# Patient Record
Sex: MALE | Race: WHITE | NOT HISPANIC OR LATINO | Employment: OTHER | URBAN - NONMETROPOLITAN AREA
[De-identification: names, ages, dates, MRNs, and addresses within clinical notes are randomized per-mention and may not be internally consistent; named-entity substitution may affect disease eponyms.]

---

## 2017-05-16 ENCOUNTER — OFFICE VISIT (OUTPATIENT)
Dept: CARDIOLOGY | Facility: CLINIC | Age: 62
End: 2017-05-16

## 2017-05-16 VITALS
DIASTOLIC BLOOD PRESSURE: 82 MMHG | HEART RATE: 62 BPM | HEIGHT: 72 IN | SYSTOLIC BLOOD PRESSURE: 120 MMHG | BODY MASS INDEX: 31.42 KG/M2 | WEIGHT: 232 LBS

## 2017-05-16 DIAGNOSIS — R94.31 ABNORMAL EKG: Primary | ICD-10-CM

## 2017-05-16 DIAGNOSIS — E78.5 HYPERLIPIDEMIA, UNSPECIFIED HYPERLIPIDEMIA TYPE: ICD-10-CM

## 2017-05-16 DIAGNOSIS — E66.01 MORBID OBESITY DUE TO EXCESS CALORIES (HCC): ICD-10-CM

## 2017-05-16 DIAGNOSIS — R53.82 CHRONIC FATIGUE: ICD-10-CM

## 2017-05-16 DIAGNOSIS — I10 ESSENTIAL HYPERTENSION: ICD-10-CM

## 2017-05-16 PROCEDURE — 93000 ELECTROCARDIOGRAM COMPLETE: CPT | Performed by: INTERNAL MEDICINE

## 2017-05-16 PROCEDURE — 99203 OFFICE O/P NEW LOW 30 MIN: CPT | Performed by: INTERNAL MEDICINE

## 2017-05-16 RX ORDER — RANITIDINE 150 MG/1
300 TABLET ORAL 2 TIMES DAILY
COMMUNITY
End: 2017-08-01

## 2017-05-16 RX ORDER — FEXOFENADINE HCL 180 MG/1
180 TABLET ORAL DAILY
COMMUNITY

## 2017-05-16 RX ORDER — LISINOPRIL 10 MG/1
10 TABLET ORAL DAILY
COMMUNITY

## 2017-05-16 RX ORDER — IBUPROFEN 400 MG/1
400 TABLET ORAL EVERY 6 HOURS PRN
COMMUNITY
End: 2017-08-01

## 2017-05-16 RX ORDER — LANOLIN ALCOHOL/MO/W.PET/CERES
50 CREAM (GRAM) TOPICAL DAILY
COMMUNITY

## 2017-05-16 RX ORDER — TOPIRAMATE 100 MG/1
100 TABLET, FILM COATED ORAL DAILY
COMMUNITY
End: 2019-02-21

## 2017-05-16 RX ORDER — HYDROCHLOROTHIAZIDE 25 MG/1
25 TABLET ORAL DAILY
COMMUNITY

## 2017-05-16 RX ORDER — SIMVASTATIN 20 MG
20 TABLET ORAL NIGHTLY
COMMUNITY

## 2017-06-06 ENCOUNTER — HOSPITAL ENCOUNTER (OUTPATIENT)
Dept: NUCLEAR MEDICINE | Facility: HOSPITAL | Age: 62
Discharge: HOME | End: 2017-06-06
Attending: INTERNAL MEDICINE

## 2017-06-06 ENCOUNTER — HOSPITAL ENCOUNTER (OUTPATIENT)
Dept: CARDIOLOGY | Facility: HOSPITAL | Age: 62
Discharge: HOME | End: 2017-06-06
Attending: INTERNAL MEDICINE

## 2017-06-06 VITALS
OXYGEN SATURATION: 100 % | HEART RATE: 51 BPM | RESPIRATION RATE: 18 BRPM | WEIGHT: 232 LBS | SYSTOLIC BLOOD PRESSURE: 111 MMHG | BODY MASS INDEX: 31.42 KG/M2 | DIASTOLIC BLOOD PRESSURE: 73 MMHG | HEIGHT: 72 IN

## 2017-06-06 VITALS
HEART RATE: 52 BPM | HEIGHT: 72 IN | WEIGHT: 232 LBS | BODY MASS INDEX: 31.42 KG/M2 | DIASTOLIC BLOOD PRESSURE: 69 MMHG | SYSTOLIC BLOOD PRESSURE: 119 MMHG

## 2017-06-06 DIAGNOSIS — R53.82 CHRONIC FATIGUE: ICD-10-CM

## 2017-06-06 DIAGNOSIS — E78.5 HYPERLIPIDEMIA, UNSPECIFIED HYPERLIPIDEMIA TYPE: ICD-10-CM

## 2017-06-06 DIAGNOSIS — R94.31 ABNORMAL EKG: ICD-10-CM

## 2017-06-06 DIAGNOSIS — I10 ESSENTIAL HYPERTENSION: ICD-10-CM

## 2017-06-06 DIAGNOSIS — E66.01 MORBID OBESITY DUE TO EXCESS CALORIES (HCC): ICD-10-CM

## 2017-06-06 LAB
AORTIC ARCH: 2.3 CM
ASCENDING AORTA: 3 CM
BH CV ECHO MEAS - ACS: 2.1 CM
BH CV ECHO MEAS - AO MAX PG (FULL): 3.1 MMHG
BH CV ECHO MEAS - AO MAX PG: 6.7 MMHG
BH CV ECHO MEAS - AO MEAN PG (FULL): 1 MMHG
BH CV ECHO MEAS - AO MEAN PG: 3 MMHG
BH CV ECHO MEAS - AO ROOT AREA (BSA CORRECTED): 1.5
BH CV ECHO MEAS - AO ROOT AREA: 8.6 CM^2
BH CV ECHO MEAS - AO ROOT DIAM: 3.3 CM
BH CV ECHO MEAS - AO V2 MAX: 129 CM/SEC
BH CV ECHO MEAS - AO V2 MEAN: 81.3 CM/SEC
BH CV ECHO MEAS - AO V2 VTI: 28.6 CM
BH CV ECHO MEAS - ASC AORTA: 3 CM
BH CV ECHO MEAS - AVA(I,A): 3.2 CM^2
BH CV ECHO MEAS - AVA(I,D): 3.2 CM^2
BH CV ECHO MEAS - AVA(V,A): 2.8 CM^2
BH CV ECHO MEAS - AVA(V,D): 2.8 CM^2
BH CV ECHO MEAS - BSA(HAYCOCK): 2.3 M^2
BH CV ECHO MEAS - BSA: 2.3 M^2
BH CV ECHO MEAS - BZI_BMI: 31.5 KILOGRAMS/M^2
BH CV ECHO MEAS - BZI_METRIC_HEIGHT: 182.9 CM
BH CV ECHO MEAS - BZI_METRIC_WEIGHT: 105.2 KG
BH CV ECHO MEAS - CONTRAST EF (2CH): 60.1 ML/M^2
BH CV ECHO MEAS - CONTRAST EF 4CH: 59 ML/M^2
BH CV ECHO MEAS - EDV(CUBED): 105.2 ML
BH CV ECHO MEAS - EDV(MOD-SP2): 80 ML
BH CV ECHO MEAS - EDV(MOD-SP4): 110 ML
BH CV ECHO MEAS - EDV(TEICH): 103.4 ML
BH CV ECHO MEAS - EF(CUBED): 69.4 %
BH CV ECHO MEAS - EF(MOD-SP2): 60.1 %
BH CV ECHO MEAS - EF(MOD-SP4): 59 %
BH CV ECHO MEAS - EF(TEICH): 61 %
BH CV ECHO MEAS - ESV(CUBED): 32.2 ML
BH CV ECHO MEAS - ESV(MOD-SP2): 31.9 ML
BH CV ECHO MEAS - ESV(MOD-SP4): 45.1 ML
BH CV ECHO MEAS - ESV(TEICH): 40.3 ML
BH CV ECHO MEAS - FS: 32.6 %
BH CV ECHO MEAS - IVS/LVPW: 1.1
BH CV ECHO MEAS - IVSD: 1.1 CM
BH CV ECHO MEAS - LAT PEAK E' VEL: 12 CM/SEC
BH CV ECHO MEAS - LV DIASTOLIC VOL/BSA (35-75): 48.5 ML/M^2
BH CV ECHO MEAS - LV MASS(C)D: 181.7 GRAMS
BH CV ECHO MEAS - LV MASS(C)DI: 80.1 GRAMS/M^2
BH CV ECHO MEAS - LV MAX PG: 3.5 MMHG
BH CV ECHO MEAS - LV MEAN PG: 2 MMHG
BH CV ECHO MEAS - LV SYSTOLIC VOL/BSA (12-30): 19.9 ML/M^2
BH CV ECHO MEAS - LV V1 MAX: 93.8 CM/SEC
BH CV ECHO MEAS - LV V1 MEAN: 63.3 CM/SEC
BH CV ECHO MEAS - LV V1 VTI: 24 CM
BH CV ECHO MEAS - LVIDD: 4.7 CM
BH CV ECHO MEAS - LVIDS: 3.2 CM
BH CV ECHO MEAS - LVLD AP2: 8.3 CM
BH CV ECHO MEAS - LVLD AP4: 8.5 CM
BH CV ECHO MEAS - LVLS AP2: 7.4 CM
BH CV ECHO MEAS - LVLS AP4: 7.9 CM
BH CV ECHO MEAS - LVOT AREA (M): 3.8 CM^2
BH CV ECHO MEAS - LVOT AREA: 3.8 CM^2
BH CV ECHO MEAS - LVOT DIAM: 2.2 CM
BH CV ECHO MEAS - LVPWD: 1 CM
BH CV ECHO MEAS - MED PEAK E' VEL: 9 CM/SEC
BH CV ECHO MEAS - MV A DUR: 0.1 SEC
BH CV ECHO MEAS - MV A MAX VEL: 64 CM/SEC
BH CV ECHO MEAS - MV DEC SLOPE: 541 CM/SEC^2
BH CV ECHO MEAS - MV DEC TIME: 0.17 SEC
BH CV ECHO MEAS - MV E MAX VEL: 90.2 CM/SEC
BH CV ECHO MEAS - MV E/A: 1.4
BH CV ECHO MEAS - MV MAX PG: 3.5 MMHG
BH CV ECHO MEAS - MV MEAN PG: 1 MMHG
BH CV ECHO MEAS - MV P1/2T MAX VEL: 99.3 CM/SEC
BH CV ECHO MEAS - MV P1/2T: 53.8 MSEC
BH CV ECHO MEAS - MV V2 MAX: 93.1 CM/SEC
BH CV ECHO MEAS - MV V2 MEAN: 40.9 CM/SEC
BH CV ECHO MEAS - MV V2 VTI: 31.2 CM
BH CV ECHO MEAS - MVA P1/2T LCG: 2.2 CM^2
BH CV ECHO MEAS - MVA(P1/2T): 4.1 CM^2
BH CV ECHO MEAS - MVA(VTI): 2.9 CM^2
BH CV ECHO MEAS - PA ACC TIME: 0.1 SEC
BH CV ECHO MEAS - PA MAX PG (FULL): 1.1 MMHG
BH CV ECHO MEAS - PA MAX PG: 3 MMHG
BH CV ECHO MEAS - PA PR(ACCEL): 34.5 MMHG
BH CV ECHO MEAS - PA V2 MAX: 86.9 CM/SEC
BH CV ECHO MEAS - PULM A REVS DUR: 0.11 SEC
BH CV ECHO MEAS - PULM A REVS VEL: 36 CM/SEC
BH CV ECHO MEAS - PULM DIAS VEL: 37.9 CM/SEC
BH CV ECHO MEAS - PULM S/D: 1.8
BH CV ECHO MEAS - PULM SYS VEL: 67.3 CM/SEC
BH CV ECHO MEAS - PVA(V,A): 2 CM^2
BH CV ECHO MEAS - PVA(V,D): 2 CM^2
BH CV ECHO MEAS - QP/QS: 0.46
BH CV ECHO MEAS - RV MAX PG: 1.9 MMHG
BH CV ECHO MEAS - RV MEAN PG: 1 MMHG
BH CV ECHO MEAS - RV V1 MAX: 69.5 CM/SEC
BH CV ECHO MEAS - RV V1 MEAN: 48.9 CM/SEC
BH CV ECHO MEAS - RV V1 VTI: 16.6 CM
BH CV ECHO MEAS - RVOT AREA: 2.5 CM^2
BH CV ECHO MEAS - RVOT DIAM: 1.8 CM
BH CV ECHO MEAS - SI(AO): 107.8 ML/M^2
BH CV ECHO MEAS - SI(CUBED): 32.2 ML/M^2
BH CV ECHO MEAS - SI(LVOT): 40.2 ML/M^2
BH CV ECHO MEAS - SI(MOD-SP2): 21.2 ML/M^2
BH CV ECHO MEAS - SI(MOD-SP4): 28.6 ML/M^2
BH CV ECHO MEAS - SI(TEICH): 27.8 ML/M^2
BH CV ECHO MEAS - SUP REN AO DIAM: 1.8 CM
BH CV ECHO MEAS - SV(AO): 244.6 ML
BH CV ECHO MEAS - SV(CUBED): 73 ML
BH CV ECHO MEAS - SV(LVOT): 91.2 ML
BH CV ECHO MEAS - SV(MOD-SP2): 48.1 ML
BH CV ECHO MEAS - SV(MOD-SP4): 64.9 ML
BH CV ECHO MEAS - SV(RVOT): 42.2 ML
BH CV ECHO MEAS - SV(TEICH): 63 ML
BH CV ECHO MEAS - TAPSE (>1.6): 2.4 CM2
BH CV NUCLEAR PRIOR STUDY: 3
BH CV STRESS BP STAGE 1: NORMAL
BH CV STRESS BP STAGE 2: NORMAL
BH CV STRESS BP STAGE 3: NORMAL
BH CV STRESS COMMENTS STAGE 1: NORMAL
BH CV STRESS COMMENTS STAGE 2: NORMAL
BH CV STRESS DOSE REGADENOSON STAGE 1: 0.4
BH CV STRESS DURATION MIN STAGE 1: 0
BH CV STRESS DURATION MIN STAGE 2: 4
BH CV STRESS DURATION SEC STAGE 1: 15
BH CV STRESS DURATION SEC STAGE 2: 0
BH CV STRESS HR STAGE 1: 56
BH CV STRESS HR STAGE 2: 76
BH CV STRESS HR STAGE 3: 65
BH CV STRESS O2 STAGE 1: 100
BH CV STRESS O2 STAGE 2: 100
BH CV STRESS PROTOCOL 1: NORMAL
BH CV STRESS RECOVERY BP: NORMAL MMHG
BH CV STRESS RECOVERY HR: 61 BPM
BH CV STRESS STAGE 1: 1
BH CV STRESS STAGE 2: 2
BH CV STRESS STAGE 3: 3
BH CV STRESS STAGE 4: 4
BH CV VAS BP RIGHT ARM: NORMAL MMHG
BH CV XLRA - RV BASE: 2.9 CM
BH CV XLRA - TDI S': 13 CM/SEC
E/E' RATIO: 9
LEFT ATRIUM VOLUME INDEX: 20 ML/M2
LV EF NUC BP: 55 %
MAXIMAL PREDICTED HEART RATE: 159 BPM
PERCENT MAX PREDICTED HR: 49.06 %
STRESS BASELINE BP: NORMAL MMHG
STRESS BASELINE HR: 51 BPM
STRESS O2 SAT REST: 100 %
STRESS PERCENT HR: 58 %
STRESS POST ESTIMATED WORKLOAD: 1 METS
STRESS POST EXERCISE DUR SEC: 30 SEC
STRESS POST PEAK BP: NORMAL MMHG
STRESS POST PEAK HR: 78 BPM
STRESS TARGET HR: 135 BPM

## 2017-06-06 PROCEDURE — 93017 CV STRESS TEST TRACING ONLY: CPT

## 2017-06-06 PROCEDURE — 0 TECHNETIUM SESTAMIBI: Performed by: INTERNAL MEDICINE

## 2017-06-06 PROCEDURE — 93306 TTE W/DOPPLER COMPLETE: CPT | Performed by: INTERNAL MEDICINE

## 2017-06-06 PROCEDURE — 25010000002 PERFLUTREN (DEFINITY) 8.476 MG IN SODIUM CHLORIDE 10 ML INJECTION: Performed by: INTERNAL MEDICINE

## 2017-06-06 PROCEDURE — A9500 TC99M SESTAMIBI: HCPCS | Performed by: INTERNAL MEDICINE

## 2017-06-06 PROCEDURE — C8929 TTE W OR WO FOL WCON,DOPPLER: HCPCS

## 2017-06-06 PROCEDURE — 25010000002 REGADENOSON 0.4 MG/5ML SOLUTION: Performed by: INTERNAL MEDICINE

## 2017-06-06 PROCEDURE — 93018 CV STRESS TEST I&R ONLY: CPT | Performed by: INTERNAL MEDICINE

## 2017-06-06 PROCEDURE — 0399T HC MYOCARDL STRAIN IMAG QUAN ASSMT PER SESS: CPT

## 2017-06-06 PROCEDURE — 78452 HT MUSCLE IMAGE SPECT MULT: CPT | Performed by: INTERNAL MEDICINE

## 2017-06-06 PROCEDURE — 78452 HT MUSCLE IMAGE SPECT MULT: CPT

## 2017-06-06 PROCEDURE — 0399T ADULT TRANSTHORACIC ECHO COMPLETE WITH CONTRAST: CPT | Performed by: INTERNAL MEDICINE

## 2017-06-06 PROCEDURE — 93016 CV STRESS TEST SUPVJ ONLY: CPT | Performed by: INTERNAL MEDICINE

## 2017-06-06 RX ADMIN — Medication 1 DOSE: at 09:15

## 2017-06-06 RX ADMIN — Medication 1 DOSE: at 08:00

## 2017-06-06 RX ADMIN — REGADENOSON 0.4 MG: 0.08 INJECTION, SOLUTION INTRAVENOUS at 09:15

## 2017-06-06 RX ADMIN — PERFLUTREN 2 ML: 6.52 INJECTION, SUSPENSION INTRAVENOUS at 10:16

## 2017-08-01 ENCOUNTER — OFFICE VISIT (OUTPATIENT)
Dept: CARDIOLOGY | Facility: CLINIC | Age: 62
End: 2017-08-01

## 2017-08-01 VITALS
BODY MASS INDEX: 29.66 KG/M2 | SYSTOLIC BLOOD PRESSURE: 102 MMHG | HEIGHT: 72 IN | HEART RATE: 53 BPM | WEIGHT: 219 LBS | DIASTOLIC BLOOD PRESSURE: 64 MMHG

## 2017-08-01 DIAGNOSIS — I10 ESSENTIAL HYPERTENSION: ICD-10-CM

## 2017-08-01 DIAGNOSIS — E78.5 HYPERLIPIDEMIA, UNSPECIFIED HYPERLIPIDEMIA TYPE: Primary | ICD-10-CM

## 2017-08-01 DIAGNOSIS — R94.31 ABNORMAL EKG: ICD-10-CM

## 2017-08-01 PROCEDURE — 99214 OFFICE O/P EST MOD 30 MIN: CPT | Performed by: INTERNAL MEDICINE

## 2017-08-01 RX ORDER — PANTOPRAZOLE SODIUM 40 MG/1
40 TABLET, DELAYED RELEASE ORAL DAILY
COMMUNITY
Start: 2017-06-30

## 2017-08-01 RX ORDER — TRAMADOL HYDROCHLORIDE 50 MG/1
50 TABLET ORAL AS NEEDED
COMMUNITY
Start: 2017-06-12

## 2017-08-01 NOTE — PROGRESS NOTES
" Subjective:       Lucas Oneil is a 62 y.o. male who here for follow up    CC  Follow-up for the stress test echo and the labs  HPI  62-year-old male with known history of coronary artery disease hyperlipidemia and hypertension underwent stress test as well as echocardiogram which are within the normal limits cholesterol level is 140, CMP was not done denies any chest pains or tightness in chest denies any heaviness with a pressure sensation     Problem List Items Addressed This Visit        Cardiovascular and Mediastinum    Hyperlipidemia - Primary    Essential hypertension       Other    Abnormal EKG        .    The following portions of the patient's history were reviewed and updated as appropriate: allergies, current medications, past family history, past medical history, past social history, past surgical history and problem list.    Past Medical History:   Diagnosis Date   • Hyperlipidemia    • Hypertension     reports that he has never smoked. He does not have any smokeless tobacco history on file. He reports that he does not drink alcohol or use illicit drugs.  Family History   Problem Relation Age of Onset   • Heart attack Father    • Heart disease Sister    • Heart attack Brother    • Heart disease Brother        Review of Systems  Constitutional: No wt loss, fever, fatigue  Gastrointestinal: No nausea, abdominal pain  Behavioral/Psych: No insomnia or anxiety   Cardiovascular No chest pains or tightness in chest  Objective:       Physical Exam             Physical Exam  /64  Pulse 53  Ht 72\" (182.9 cm)  Wt 219 lb (99.3 kg)  BMI 29.7 kg/m2    General appearance: NAD, conversant   Eyes: anicteric sclerae, moist conjunctivae; no lid-lag; PERRLA   HENT: Atraumatic; oropharynx clear with moist mucous membranes and no mucosal ulcerations;  normal hard and soft palate   Neck: Trachea midline; FROM, supple, no thyromegaly or lymphadenopathy   Lungs: CTA, with normal respiratory effort and no " intercostal retractions   CV: S1-S2 regular, no murmurs, no rub, no gallop   Abdomen: Soft, non-tender; no masses or HSM   Extremities: No peripheral edema or extremity lymphadenopathy  Skin: Normal temperature, turgor and texture; no rash, ulcers or subcutaneous nodules   Psych: Appropriate affect, alert and oriented to person, place and time           Cardiographics  @Procedures    Echocardiogram:        Current Outpatient Prescriptions:   •  hydrochlorothiazide (HYDRODIURIL) 25 MG tablet, Take 25 mg by mouth Daily., Disp: , Rfl:   •  lisinopril (PRINIVIL,ZESTRIL) 10 MG tablet, Take 10 mg by mouth Daily., Disp: , Rfl:   •  pantoprazole (PROTONIX) 40 MG EC tablet, Take 40 mg by mouth Daily., Disp: , Rfl:   •  simvastatin (ZOCOR) 20 MG tablet, Take 20 mg by mouth Every Night., Disp: , Rfl:   •  topiramate (TOPAMAX) 100 MG tablet, Take 100 mg by mouth Daily., Disp: , Rfl:   •  traMADol (ULTRAM) 50 MG tablet, Take 50 mg by mouth As Needed., Disp: , Rfl:   •  vitamin B-6 (PYRIDOXINE) 50 MG tablet, Take 50 mg by mouth Daily., Disp: , Rfl:   •  fexofenadine (ALLEGRA) 180 MG tablet, Take 180 mg by mouth Daily., Disp: , Rfl:    Assessment:        Patient Active Problem List   Diagnosis   • Abnormal EKG   • Chronic fatigue   • Hyperlipidemia   • Essential hypertension   • Morbid obesity due to excess calories     Interpretation Summary   · Findings consistent with a normal ECG stress test.  · Left ventricular ejection fraction is normal (Calculated EF = 55%).  · Myocardial perfusion imaging indicates a normal myocardial perfusion study with no evidence of ischemia.  · Impressions are consistent with a low risk study.  · THin apex appears normal varient     Interpretation Summary   · ABNORMAL GLOBAL STRAIN PATTERN  · Left Ventricle: Calculated EF = 59%  · Trace-to-mild mitral valve regurgitation  · There is no evidence of pericardial effusion           cholestrol 140    Triglycerides 144            Plan:            ICD-10-CM  ICD-9-CM   1. Hyperlipidemia, unspecified hyperlipidemia type E78.5 272.4   2. Essential hypertension I10 401.9   3. Abnormal EKG R94.31 794.31     1. Hyperlipidemia, unspecified hyperlipidemia type  Cholesterol is 140 will be repeated in 6 months    2. Essential hypertension  Well-controlled with current medications    3. Abnormal EKG  Stress test and echocardiograms are within the normal limits    See us 6 months with lab  COUNSELING:    Lucas Worthy was given to patient for the following topics: diagnostic results, risk factor reductions, impressions, risks and benefits of treatment options and importance of treatment compliance .       SMOKING COUNSELING:    Counseling given: Not Answered      EMR Dragon/Transcription disclaimer:   Much of this encounter note is an electronic transcription/translation of spoken language to printed text. The electronic translation of spoken language may permit erroneous, or at times, nonsensical words or phrases to be inadvertently transcribed; Although I have reviewed the note for such errors, some may still exist.

## 2018-02-27 ENCOUNTER — OFFICE VISIT (OUTPATIENT)
Dept: CARDIOLOGY | Facility: CLINIC | Age: 63
End: 2018-02-27

## 2018-02-27 VITALS
DIASTOLIC BLOOD PRESSURE: 70 MMHG | HEIGHT: 72 IN | SYSTOLIC BLOOD PRESSURE: 126 MMHG | HEART RATE: 60 BPM | WEIGHT: 233 LBS | BODY MASS INDEX: 31.56 KG/M2

## 2018-02-27 DIAGNOSIS — I10 ESSENTIAL HYPERTENSION: ICD-10-CM

## 2018-02-27 DIAGNOSIS — E78.5 HYPERLIPIDEMIA, UNSPECIFIED HYPERLIPIDEMIA TYPE: ICD-10-CM

## 2018-02-27 DIAGNOSIS — R94.31 ABNORMAL EKG: Primary | ICD-10-CM

## 2018-02-27 PROCEDURE — 99213 OFFICE O/P EST LOW 20 MIN: CPT | Performed by: INTERNAL MEDICINE

## 2018-02-27 PROCEDURE — 93000 ELECTROCARDIOGRAM COMPLETE: CPT | Performed by: INTERNAL MEDICINE

## 2018-02-27 RX ORDER — ROPINIROLE 1 MG/1
TABLET, FILM COATED ORAL
COMMUNITY
Start: 2018-02-13 | End: 2019-02-21

## 2018-02-27 NOTE — PROGRESS NOTES
" Subjective:       Lucas Oneil is a 62 y.o. male who here for follow up    CC  6 months follow-up for hypertension and hyperlipidemia  HPI  62-year-old male with abnormal EKG, hyperlipidemia, benign essential arterial hypertension here for the follow-up denies any chest pains or tightness in chest no heaviness with a pressure sensation     Problem List Items Addressed This Visit        Cardiovascular and Mediastinum    Abnormal EKG - Primary    Hyperlipidemia    Essential hypertension        .    The following portions of the patient's history were reviewed and updated as appropriate: allergies, current medications, past family history, past medical history, past social history, past surgical history and problem list.    Past Medical History:   Diagnosis Date   • Hyperlipidemia    • Hypertension     reports that he has never smoked. He does not have any smokeless tobacco history on file. He reports that he does not drink alcohol or use illicit drugs.  Family History   Problem Relation Age of Onset   • Heart attack Father    • Heart disease Sister    • Heart attack Brother    • Heart disease Brother        Review of Systems  Constitutional: No wt loss, fever, fatigue  Gastrointestinal: No nausea, abdominal pain  Behavioral/Psych: No insomnia or anxiety   Cardiovascular No chest pains or tightness in the chest  Objective:       Physical Exam             Physical Exam  /70  Pulse 60  Ht 182.9 cm (72\")  Wt 106 kg (233 lb)  BMI 31.6 kg/m2    General appearance: NAD, conversant   Eyes: anicteric sclerae, moist conjunctivae; no lid-lag; PERRLA   HENT: Atraumatic; oropharynx clear with moist mucous membranes and no mucosal ulcerations;  normal hard and soft palate   Neck: Trachea midline; FROM, supple, no thyromegaly or lymphadenopathy   Lungs: CTA, with normal respiratory effort and no intercostal retractions   CV: S1-S2 regular, no murmurs, no rub, no gallop   Abdomen: Soft, non-tender; no masses or HSM "   Extremities: No peripheral edema or extremity lymphadenopathy  Skin: Normal temperature, turgor and texture; no rash, ulcers or subcutaneous nodules   Psych: Appropriate affect, alert and oriented to person, place and time           Cardiographics  @  ECG 12 Lead  Date/Time: 2/27/2018 1:21 PM  Performed by: LANE MONTE  Authorized by: LANE MONTE   Comparison: compared with previous ECG   Similar to previous ECG  Rhythm: sinus rhythm  ST Flattening: all  Clinical impression: non-specific ECG            Echocardiogram:        Current Outpatient Prescriptions:   •  fexofenadine (ALLEGRA) 180 MG tablet, Take 180 mg by mouth Daily., Disp: , Rfl:   •  hydrochlorothiazide (HYDRODIURIL) 25 MG tablet, Take 25 mg by mouth Daily., Disp: , Rfl:   •  lisinopril (PRINIVIL,ZESTRIL) 10 MG tablet, Take 10 mg by mouth Daily., Disp: , Rfl:   •  pantoprazole (PROTONIX) 40 MG EC tablet, Take 40 mg by mouth Daily., Disp: , Rfl:   •  rOPINIRole (REQUIP) 1 MG tablet, , Disp: , Rfl:   •  simvastatin (ZOCOR) 20 MG tablet, Take 20 mg by mouth Every Night., Disp: , Rfl:   •  topiramate (TOPAMAX) 100 MG tablet, Take 100 mg by mouth Daily., Disp: , Rfl:   •  traMADol (ULTRAM) 50 MG tablet, Take 50 mg by mouth As Needed., Disp: , Rfl:   •  vitamin B-6 (PYRIDOXINE) 50 MG tablet, Take 50 mg by mouth Daily., Disp: , Rfl:    Assessment:        Patient Active Problem List   Diagnosis   • Abnormal EKG   • Chronic fatigue   • Hyperlipidemia   • Essential hypertension   • Morbid obesity due to excess calories               Plan:            ICD-10-CM ICD-9-CM   1. Abnormal EKG R94.31 794.31   2. Hyperlipidemia, unspecified hyperlipidemia type E78.5 272.4   3. Essential hypertension I10 401.9     1. Abnormal EKG  Workup has been negative    2. Hyperlipidemia, unspecified hyperlipidemia type  Risk of the hyperlipidemia, importance of the treatment has been explained    Pros and cons of the statins has been explained    Regular blood  workup as well as side effects including the liver failure, myelopathy death has been explained          3. Essential hypertension  Importance of controlling hypertension and blood pressure  checkup on the regular basis has been explained  Hypertension as a silent killer has been discussed  Risk reduction of the weight and regular exercises to control the hypertension has been explained      See us in one year  COUNSELING:    Lucas Worthy was given to patient for the following topics: diagnostic results, risk factor reductions, impressions, risks and benefits of treatment options and importance of treatment compliance .       SMOKING COUNSELING:    Counseling given: Not Answered      EMR Dragon/Transcription disclaimer:   Much of this encounter note is an electronic transcription/translation of spoken language to printed text. The electronic translation of spoken language may permit erroneous, or at times, nonsensical words or phrases to be inadvertently transcribed; Although I have reviewed the note for such errors, some may still exist.

## 2019-02-21 ENCOUNTER — HOSPITAL ENCOUNTER (OUTPATIENT)
Facility: HOSPITAL | Age: 64
Setting detail: HOSPITAL OUTPATIENT SURGERY
End: 2019-02-21
Attending: INTERNAL MEDICINE | Admitting: INTERNAL MEDICINE

## 2019-02-21 ENCOUNTER — OFFICE VISIT (OUTPATIENT)
Dept: GASTROENTEROLOGY | Facility: CLINIC | Age: 64
End: 2019-02-21

## 2019-02-21 VITALS
WEIGHT: 246 LBS | SYSTOLIC BLOOD PRESSURE: 128 MMHG | DIASTOLIC BLOOD PRESSURE: 72 MMHG | HEIGHT: 72 IN | BODY MASS INDEX: 33.32 KG/M2

## 2019-02-21 DIAGNOSIS — R13.19 ESOPHAGEAL DYSPHAGIA: Primary | ICD-10-CM

## 2019-02-21 DIAGNOSIS — R09.89 GLOBUS SENSATION: ICD-10-CM

## 2019-02-21 DIAGNOSIS — D12.6 ADENOMATOUS POLYP OF COLON, UNSPECIFIED PART OF COLON: ICD-10-CM

## 2019-02-21 PROCEDURE — 99204 OFFICE O/P NEW MOD 45 MIN: CPT | Performed by: INTERNAL MEDICINE

## 2019-02-21 RX ORDER — ASPIRIN 81 MG/1
81 TABLET ORAL DAILY
COMMUNITY

## 2019-02-21 NOTE — PROGRESS NOTES
PATIENT INFORMATION  Lucas Oneil       - 1955    CHIEF COMPLAINT  Chief Complaint   Patient presents with   • Abdominal Pain   • Heartburn   • Constipation   • Difficulty Swallowing       HISTORY OF PRESENT ILLNESS  Has seen Ayana for colon a couple of years ago(2-3) and Had polyps    Now has globus and PND with feeling of something in his throat all the time and has to cough up the phjlegm and has even thrown up and hs to deal with drainage all night. Chews his food well because some times meat hangs up and fluid always pushes it down. Hs treated his allergies and used a Phyllis Pot but no help.    Has only been on his daily PPI for about a year no previous HB or reflux symptoms            REVIEW OF SYSTEMS  Review of Systems   Constitutional: Positive for activity change and fatigue.   HENT: Positive for congestion, ear pain, postnasal drip, rhinorrhea, sinus pressure, sinus pain, sneezing and trouble swallowing.    Respiratory: Positive for cough.    Gastrointestinal: Positive for abdominal pain, constipation, nausea and vomiting.        Reflux   Musculoskeletal: Positive for arthralgias, back pain and myalgias.   All other systems reviewed and are negative.        ACTIVE PROBLEMS  Patient Active Problem List    Diagnosis   • Abnormal EKG [R94.31]   • Chronic fatigue [R53.82]   • Hyperlipidemia [E78.5]   • Essential hypertension [I10]   • Morbid obesity due to excess calories (CMS/HCC) [E66.01]         PAST MEDICAL HISTORY  Past Medical History:   Diagnosis Date   • Hyperlipidemia    • Hypertension          SURGICAL HISTORY  Past Surgical History:   Procedure Laterality Date   • COLONOSCOPY           FAMILY HISTORY  Family History   Problem Relation Age of Onset   • Heart attack Father    • Heart disease Sister    • Heart attack Brother    • Heart disease Brother    • Colon cancer Neg Hx    • Colon polyps Neg Hx          SOCIAL HISTORY  Social History     Occupational History   • Not on file  "  Tobacco Use   • Smoking status: Never Smoker   Substance and Sexual Activity   • Alcohol use: No   • Drug use: No   • Sexual activity: Defer       Debilities/Disabilities Identified: None    Emotional Behavior: Appropriate    CURRENT MEDICATIONS    Current Outpatient Medications:   •  aspirin 81 MG EC tablet, Take 81 mg by mouth Daily., Disp: , Rfl:   •  hydrochlorothiazide (HYDRODIURIL) 25 MG tablet, Take 25 mg by mouth Daily., Disp: , Rfl:   •  lisinopril (PRINIVIL,ZESTRIL) 10 MG tablet, Take 10 mg by mouth Daily., Disp: , Rfl:   •  pantoprazole (PROTONIX) 40 MG EC tablet, Take 40 mg by mouth Daily., Disp: , Rfl:   •  simvastatin (ZOCOR) 20 MG tablet, Take 20 mg by mouth Every Night., Disp: , Rfl:   •  traMADol (ULTRAM) 50 MG tablet, Take 50 mg by mouth As Needed., Disp: , Rfl:   •  vitamin B-6 (PYRIDOXINE) 50 MG tablet, Take 50 mg by mouth Daily., Disp: , Rfl:   •  fexofenadine (ALLEGRA) 180 MG tablet, Take 180 mg by mouth Daily., Disp: , Rfl:     ALLERGIES  Patient has no known allergies.    VITALS  Vitals:    02/21/19 1212   BP: 128/72   Weight: 112 kg (246 lb)   Height: 182.9 cm (72.01\")       LAST RESULTS   Hospital Outpatient Visit on 06/06/2017   Component Date Value Ref Range Status   • BSA 06/06/2017 2.3  m^2 Preliminary   • IVSd 06/06/2017 1.1  cm Preliminary   • LVIDd 06/06/2017 4.7  cm Preliminary   • LVIDs 06/06/2017 3.2  cm Preliminary   • LVPWd 06/06/2017 1.0  cm Preliminary   • IVS/LVPW 06/06/2017 1.1   Preliminary   • FS 06/06/2017 32.6  % Preliminary   • EDV(Teich) 06/06/2017 103.4  ml Preliminary   • ESV(Teich) 06/06/2017 40.3  ml Preliminary   • EF(Teich) 06/06/2017 61.0  % Preliminary   • EDV(cubed) 06/06/2017 105.2  ml Preliminary   • ESV(cubed) 06/06/2017 32.2  ml Preliminary   • EF(cubed) 06/06/2017 69.4  % Preliminary   • LV mass(C)d 06/06/2017 181.7  grams Preliminary   • LV mass(C)dI 06/06/2017 80.1  grams/m^2 Preliminary   • SV(Teich) 06/06/2017 63.0  ml Preliminary   • SI(Teich) " 06/06/2017 27.8  ml/m^2 Preliminary   • SV(cubed) 06/06/2017 73.0  ml Preliminary   • SI(cubed) 06/06/2017 32.2  ml/m^2 Preliminary   • Ao root diam 06/06/2017 3.3  cm Preliminary   • Ao root area 06/06/2017 8.6  cm^2 Preliminary   • ACS 06/06/2017 2.1  cm Preliminary   • asc Aorta Diam 06/06/2017 3.0  cm Preliminary   • LVOT diam 06/06/2017 2.2  cm Preliminary   • LVOT area 06/06/2017 3.8  cm^2 Preliminary   • LVOT area(traced) 06/06/2017 3.8  cm^2 Preliminary   • RVOT diam 06/06/2017 1.8  cm Preliminary   • RVOT area 06/06/2017 2.5  cm^2 Preliminary   • LVLd ap4 06/06/2017 8.5  cm Preliminary   • EDV(MOD-sp4) 06/06/2017 110.0  ml Preliminary   • LVLs ap4 06/06/2017 7.9  cm Preliminary   • ESV(MOD-sp4) 06/06/2017 45.1  ml Preliminary   • EF(MOD-sp4) 06/06/2017 59.0  % Preliminary   • LVLd ap2 06/06/2017 8.3  cm Preliminary   • EDV(MOD-sp2) 06/06/2017 80.0  ml Preliminary   • LVLs ap2 06/06/2017 7.4  cm Preliminary   • ESV(MOD-sp2) 06/06/2017 31.9  ml Preliminary   • EF(MOD-sp2) 06/06/2017 60.1  % Preliminary   • SV(MOD-sp4) 06/06/2017 64.9  ml Preliminary   • SI(MOD-sp4) 06/06/2017 28.6  ml/m^2 Preliminary   • SV(MOD-sp2) 06/06/2017 48.1  ml Preliminary   • SI(MOD-sp2) 06/06/2017 21.2  ml/m^2 Preliminary   • Ao root area (BSA corrected) 06/06/2017 1.5   Preliminary   • EF - Contrast (2Ch) 06/06/2017 60.1  ml/m^2 Preliminary   • EF - Contrast (4Ch) 06/06/2017 59.0  ml/m^2 Preliminary   • LV Rubio Vol (BSA corrected) 06/06/2017 48.5  ml/m^2 Preliminary   • LV Sys Vol (BSA corrected) 06/06/2017 19.9  ml/m^2 Preliminary   • MV A dur 06/06/2017 0.1  sec Preliminary   • MV E max dennis 06/06/2017 90.2  cm/sec Preliminary   • MV A max dennis 06/06/2017 64.0  cm/sec Preliminary   • MV E/A 06/06/2017 1.4   Preliminary   • MV V2 max 06/06/2017 93.1  cm/sec Preliminary   • MV max PG 06/06/2017 3.5  mmHg Preliminary   • MV V2 mean 06/06/2017 40.9  cm/sec Preliminary   • MV mean PG 06/06/2017 1.0  mmHg Preliminary   • MV V2 VTI  06/06/2017 31.2  cm Preliminary   • MVA(VTI) 06/06/2017 2.9  cm^2 Preliminary   • MV P1/2t max dennis 06/06/2017 99.3  cm/sec Preliminary   • MV P1/2t 06/06/2017 53.8  msec Preliminary   • MVA(P1/2t) 06/06/2017 4.1  cm^2 Preliminary   • MV dec slope 06/06/2017 541.0  cm/sec^2 Preliminary   • MV dec time 06/06/2017 0.17  sec Preliminary   • Ao pk dennis 06/06/2017 129.0  cm/sec Preliminary   • Ao max PG 06/06/2017 6.7  mmHg Preliminary   • Ao max PG (full) 06/06/2017 3.1  mmHg Preliminary   • Ao V2 mean 06/06/2017 81.3  cm/sec Preliminary   • Ao mean PG 06/06/2017 3.0  mmHg Preliminary   • Ao mean PG (full) 06/06/2017 1.0  mmHg Preliminary   • Ao V2 VTI 06/06/2017 28.6  cm Preliminary   • ALDO(I,A) 06/06/2017 3.2  cm^2 Preliminary   • ALDO(I,D) 06/06/2017 3.2  cm^2 Preliminary   • ALDO(V,A) 06/06/2017 2.8  cm^2 Preliminary   • ALDO(V,D) 06/06/2017 2.8  cm^2 Preliminary   • LV V1 max PG 06/06/2017 3.5  mmHg Preliminary   • LV V1 mean PG 06/06/2017 2.0  mmHg Preliminary   • LV V1 max 06/06/2017 93.8  cm/sec Preliminary   • LV V1 mean 06/06/2017 63.3  cm/sec Preliminary   • LV V1 VTI 06/06/2017 24.0  cm Preliminary   • SV(Ao) 06/06/2017 244.6  ml Preliminary   • SI(Ao) 06/06/2017 107.8  ml/m^2 Preliminary   • SV(LVOT) 06/06/2017 91.2  ml Preliminary   • SV(RVOT) 06/06/2017 42.2  ml Preliminary   • SI(LVOT) 06/06/2017 40.2  ml/m^2 Preliminary   • PA V2 max 06/06/2017 86.9  cm/sec Preliminary   • PA max PG 06/06/2017 3.0  mmHg Preliminary   • PA max PG (full) 06/06/2017 1.1  mmHg Preliminary   • BH CV ECHO JAMIE - PVA(V,A) 06/06/2017 2.0  cm^2 Preliminary   • BH CV ECHO JAMIE - PVA(V,D) 06/06/2017 2.0  cm^2 Preliminary   • PA acc time 06/06/2017 0.1  sec Preliminary   • RV V1 max PG 06/06/2017 1.9  mmHg Preliminary   • RV V1 mean PG 06/06/2017 1.0  mmHg Preliminary   • RV V1 max 06/06/2017 69.5  cm/sec Preliminary   • RV V1 mean 06/06/2017 48.9  cm/sec Preliminary   • RV V1 VTI 06/06/2017 16.6  cm Preliminary   • PA pr(Accel)  06/06/2017 34.5  mmHg Preliminary   • Pulm Sys Humberto 06/06/2017 67.3  cm/sec Preliminary   • Pulm Rubio Humberto 06/06/2017 37.9  cm/sec Preliminary   • Pulm S/D 06/06/2017 1.8   Preliminary   • Qp/Qs 06/06/2017 0.46   Preliminary   • Pulm A Revs Dur 06/06/2017 0.11  sec Preliminary   • Pulm A Revs Humberto 06/06/2017 36.0  cm/sec Preliminary   • MVA P1/2T LCG 06/06/2017 2.2  cm^2 Preliminary   • BH CV ECHO JAMIE - BZI_BMI 06/06/2017 31.5  kilograms/m^2 Preliminary   • BH CV ECHO JAMIE - BSA(H[place]OCK) 06/06/2017 2.3  m^2 Preliminary   • BH CV ECHO JAMIE - BZI_METRIC_WEIGHT 06/06/2017 105.2  kg Preliminary   • BH CV ECHO JAMIE - BZI_METRIC_HEIGHT 06/06/2017 182.9  cm Preliminary   • BH CV VAS BP RIGHT ARM 06/06/2017 119/69  mmHg Final   • TDI S' 06/06/2017 13.00  cm/sec Final   • RV Base 06/06/2017 2.90  cm Final   • Ascending aorta 06/06/2017 3.00  cm Final   • Aortic arch 06/06/2017 2.30  cm Final   • E/E' ratio 06/06/2017 9.0   Final   • LA Volume Index 06/06/2017 20.0  mL/m2 Final   • Lat Peak E' Humberto 06/06/2017 12.0  cm/sec Final   • Med Peak E' Humberto 06/06/2017 9.00  cm/sec Final   • Abdo Ao Diam 06/06/2017 1.80  cm Final   • TAPSE (>1.6) 06/06/2017 2.40  cm2 Final     No results found.    PHYSICAL EXAM  Physical Exam   Constitutional: He is oriented to person, place, and time. He appears well-developed and well-nourished.   HENT:   Head: Normocephalic and atraumatic.   Eyes: Conjunctivae and EOM are normal. Pupils are equal, round, and reactive to light. No scleral icterus.   Neck: Normal range of motion. Neck supple. No thyromegaly present.   Cardiovascular: Normal rate, regular rhythm, normal heart sounds and intact distal pulses. Exam reveals no gallop.   No murmur heard.  Pulmonary/Chest: Effort normal and breath sounds normal. He has no wheezes. He has no rales.   Abdominal: Soft. Bowel sounds are normal. He exhibits no shifting dullness, no distension, no fluid wave, no abdominal bruit, no ascites and no mass. There is no  hepatosplenomegaly. There is no tenderness. There is no guarding and negative Lema's sign. Hernia confirmed negative in the ventral area.   Mid line raschisis   Musculoskeletal: Normal range of motion. He exhibits no edema.   Lymphadenopathy:     He has no cervical adenopathy.   Neurological: He is alert and oriented to person, place, and time.   Skin: Skin is warm and dry. No rash noted. He is not diaphoretic. No erythema.   Psychiatric: He has a normal mood and affect. His behavior is normal.       ASSESSMENT  Diagnoses and all orders for this visit:    Esophageal dysphagia    Globus sensation  -     Case Request; Standing  -     Case Request    Adenomatous polyp of colon, unspecified part of colon    Other orders  -     aspirin 81 MG EC tablet; Take 81 mg by mouth Daily.  -     Follow Anesthesia Guidelines / Standing Orders; Future  -     Obtain Informed Consent; Standing          PLAN    Get records from [facility] from his Colonoscopy/Path from 2 or so years ago    Return in about 3 months (around 5/21/2019).

## 2019-03-19 ENCOUNTER — ANESTHESIA EVENT (OUTPATIENT)
Dept: PERIOP | Facility: HOSPITAL | Age: 64
End: 2019-03-19

## 2019-03-19 RX ORDER — LIDOCAINE HYDROCHLORIDE 10 MG/ML
0.5 INJECTION, SOLUTION EPIDURAL; INFILTRATION; INTRACAUDAL; PERINEURAL ONCE AS NEEDED
Status: CANCELLED | OUTPATIENT
Start: 2019-03-19

## 2019-03-19 RX ORDER — SODIUM CHLORIDE 0.9 % (FLUSH) 0.9 %
3 SYRINGE (ML) INJECTION EVERY 12 HOURS SCHEDULED
Status: CANCELLED | OUTPATIENT
Start: 2019-03-19

## 2019-03-19 RX ORDER — SODIUM CHLORIDE, SODIUM LACTATE, POTASSIUM CHLORIDE, CALCIUM CHLORIDE 600; 310; 30; 20 MG/100ML; MG/100ML; MG/100ML; MG/100ML
9 INJECTION, SOLUTION INTRAVENOUS CONTINUOUS
Status: CANCELLED | OUTPATIENT
Start: 2019-03-19

## 2019-03-19 RX ORDER — SODIUM CHLORIDE 0.9 % (FLUSH) 0.9 %
1-10 SYRINGE (ML) INJECTION AS NEEDED
Status: CANCELLED | OUTPATIENT
Start: 2019-03-19

## 2019-03-19 RX ORDER — SODIUM CHLORIDE 9 MG/ML
40 INJECTION, SOLUTION INTRAVENOUS AS NEEDED
Status: CANCELLED | OUTPATIENT
Start: 2019-03-19

## 2019-03-20 ENCOUNTER — ANESTHESIA (OUTPATIENT)
Dept: PERIOP | Facility: HOSPITAL | Age: 64
End: 2019-03-20

## 2019-03-20 ENCOUNTER — TELEPHONE (OUTPATIENT)
Dept: GASTROENTEROLOGY | Facility: CLINIC | Age: 64
End: 2019-03-20

## 2019-03-20 NOTE — TELEPHONE ENCOUNTER
WAS SCHEDULED FOR TODAY, 03/20/2019 AT [place] FOR EGD.  HIS  IN THE HOSPITAL AND NEED TO RESCHEDULE.  MOVED TO 04/17/2019 ARRIVE 11:30AM.  WILL SEND NEW INSTRUCTIONS.

## 2019-03-21 ENCOUNTER — PREP FOR SURGERY (OUTPATIENT)
Dept: OTHER | Facility: HOSPITAL | Age: 64
End: 2019-03-21

## 2019-03-21 DIAGNOSIS — R09.89 GLOBUS SENSATION: Primary | ICD-10-CM

## 2019-04-17 ENCOUNTER — ANESTHESIA (OUTPATIENT)
Dept: PERIOP | Facility: HOSPITAL | Age: 64
End: 2019-04-17

## 2019-04-17 ENCOUNTER — ANESTHESIA EVENT (OUTPATIENT)
Dept: PERIOP | Facility: HOSPITAL | Age: 64
End: 2019-04-17

## 2019-04-17 ENCOUNTER — HOSPITAL ENCOUNTER (OUTPATIENT)
Facility: HOSPITAL | Age: 64
Setting detail: HOSPITAL OUTPATIENT SURGERY
Discharge: HOME | End: 2019-04-17
Attending: INTERNAL MEDICINE | Admitting: INTERNAL MEDICINE

## 2019-04-17 VITALS
BODY MASS INDEX: 33.46 KG/M2 | DIASTOLIC BLOOD PRESSURE: 62 MMHG | RESPIRATION RATE: 18 BRPM | HEART RATE: 87 BPM | TEMPERATURE: 97.6 F | WEIGHT: 246.8 LBS | SYSTOLIC BLOOD PRESSURE: 106 MMHG | OXYGEN SATURATION: 98 %

## 2019-04-17 DIAGNOSIS — R09.89 GLOBUS SENSATION: ICD-10-CM

## 2019-04-17 PROCEDURE — 88305 TISSUE EXAM BY PATHOLOGIST: CPT | Performed by: INTERNAL MEDICINE

## 2019-04-17 PROCEDURE — 25010000002 PROPOFOL 10 MG/ML EMULSION: Performed by: NURSE ANESTHETIST, CERTIFIED REGISTERED

## 2019-04-17 PROCEDURE — 43239 EGD BIOPSY SINGLE/MULTIPLE: CPT | Performed by: INTERNAL MEDICINE

## 2019-04-17 RX ORDER — PROPOFOL 10 MG/ML
VIAL (ML) INTRAVENOUS AS NEEDED
Status: DISCONTINUED | OUTPATIENT
Start: 2019-04-17 | End: 2019-04-17 | Stop reason: SURG

## 2019-04-17 RX ORDER — MEPERIDINE HYDROCHLORIDE 25 MG/ML
12.5 INJECTION INTRAMUSCULAR; INTRAVENOUS; SUBCUTANEOUS
Status: CANCELLED | OUTPATIENT
Start: 2019-04-17 | End: 2019-04-18

## 2019-04-17 RX ORDER — SODIUM CHLORIDE 9 MG/ML
40 INJECTION, SOLUTION INTRAVENOUS AS NEEDED
Status: DISCONTINUED | OUTPATIENT
Start: 2019-04-17 | End: 2019-04-17 | Stop reason: HOSPADM

## 2019-04-17 RX ORDER — LIDOCAINE HYDROCHLORIDE 20 MG/ML
INJECTION, SOLUTION INFILTRATION; PERINEURAL AS NEEDED
Status: DISCONTINUED | OUTPATIENT
Start: 2019-04-17 | End: 2019-04-17 | Stop reason: SURG

## 2019-04-17 RX ORDER — SODIUM CHLORIDE, SODIUM LACTATE, POTASSIUM CHLORIDE, CALCIUM CHLORIDE 600; 310; 30; 20 MG/100ML; MG/100ML; MG/100ML; MG/100ML
100 INJECTION, SOLUTION INTRAVENOUS CONTINUOUS
Status: CANCELLED | OUTPATIENT
Start: 2019-04-17

## 2019-04-17 RX ORDER — SODIUM CHLORIDE 0.9 % (FLUSH) 0.9 %
3 SYRINGE (ML) INJECTION EVERY 12 HOURS SCHEDULED
Status: DISCONTINUED | OUTPATIENT
Start: 2019-04-17 | End: 2019-04-17 | Stop reason: HOSPADM

## 2019-04-17 RX ORDER — LIDOCAINE HYDROCHLORIDE 10 MG/ML
0.5 INJECTION, SOLUTION EPIDURAL; INFILTRATION; INTRACAUDAL; PERINEURAL ONCE AS NEEDED
Status: COMPLETED | OUTPATIENT
Start: 2019-04-17 | End: 2019-04-17

## 2019-04-17 RX ORDER — PANTOPRAZOLE SODIUM 40 MG/1
40 TABLET, DELAYED RELEASE ORAL 2 TIMES DAILY
Qty: 180 TABLET | Refills: 3 | Status: SHIPPED | OUTPATIENT
Start: 2019-04-17

## 2019-04-17 RX ORDER — SODIUM CHLORIDE, SODIUM LACTATE, POTASSIUM CHLORIDE, CALCIUM CHLORIDE 600; 310; 30; 20 MG/100ML; MG/100ML; MG/100ML; MG/100ML
9 INJECTION, SOLUTION INTRAVENOUS CONTINUOUS
Status: DISCONTINUED | OUTPATIENT
Start: 2019-04-17 | End: 2019-04-17 | Stop reason: HOSPADM

## 2019-04-17 RX ORDER — ONDANSETRON 2 MG/ML
4 INJECTION INTRAMUSCULAR; INTRAVENOUS ONCE AS NEEDED
Status: CANCELLED | OUTPATIENT
Start: 2019-04-17

## 2019-04-17 RX ORDER — SODIUM CHLORIDE 0.9 % (FLUSH) 0.9 %
1-10 SYRINGE (ML) INJECTION AS NEEDED
Status: DISCONTINUED | OUTPATIENT
Start: 2019-04-17 | End: 2019-04-17 | Stop reason: HOSPADM

## 2019-04-17 RX ADMIN — PROPOFOL 100 MG: 10 INJECTION, EMULSION INTRAVENOUS at 12:42

## 2019-04-17 RX ADMIN — LIDOCAINE HYDROCHLORIDE 100 MG: 20 INJECTION, SOLUTION INFILTRATION; PERINEURAL at 12:41

## 2019-04-17 RX ADMIN — PROPOFOL 50 MG: 10 INJECTION, EMULSION INTRAVENOUS at 12:47

## 2019-04-17 RX ADMIN — SODIUM CHLORIDE, POTASSIUM CHLORIDE, SODIUM LACTATE AND CALCIUM CHLORIDE 9 ML/HR: 600; 310; 30; 20 INJECTION, SOLUTION INTRAVENOUS at 11:52

## 2019-04-17 RX ADMIN — LIDOCAINE HYDROCHLORIDE 0.5 ML: 10 INJECTION, SOLUTION EPIDURAL; INFILTRATION; INTRACAUDAL; PERINEURAL at 11:52

## 2019-04-17 NOTE — BRIEF OP NOTE
ESOPHAGOGASTRODUODENOSCOPY  Progress Note    Lucas Oneil  4/17/2019    Pre-op Diagnosis:   Globus sensation [F45.8]       Post-Op Diagnosis Codes:     * Globus sensation [F45.8]     * Reflux esophagitis [K21.0]    Procedure/CPT® Codes:      Procedure(s):  ESOPHAGOGASTRODUODENOSCOPY with biopsy    Surgeon(s):  Tristen Brown MD    Anesthesia: Monitor Anesthesia Care    Staff:   Circulator: Stormy Ching RN  Scrub Person: Jagruti Zarate    Estimated Blood Loss: none    Urine Voided: * No values recorded between 4/17/2019 12:37 PM and 4/17/2019 12:49 PM *    Specimens:                Specimens     ID Source Type Tests Collected By Collected At Frozen?      A Esophagus, Distal Tissue · TISSUE PATHOLOGY EXAM   Tristen Brown MD 4/17/19 1247      Description: distal esophageal biopsy                Drains:      Findings: Normal Duodenum  Normal Stomach  Reflux Esophagitis-Biopsy    Complications: None      Tristen Brown MD     Date: 4/17/2019  Time: 12:51 PM

## 2019-04-17 NOTE — OP NOTE
ESOPHAGOGASTRODUODENOSCOPY  Procedure Report    Patient Name:  Lucas Oneil  YOB: 1955    Date of Surgery:  4/17/2019     Indications:  Globus Sensation    Pre-op Diagnosis:   Globus sensation [F45.8]    Post-Op Diagnosis Codes:     * Globus sensation [F45.8]     * Reflux esophagitis [K21.0]         Procedure/CPT® Codes:      Procedure(s):  ESOPHAGOGASTRODUODENOSCOPY with biopsy    Staff:  Surgeon(s):  Tristen Brown MD         Anesthesia: Monitor Anesthesia Care    Estimated Blood Loss: none    Specimens:   ID Type Source Tests Collected by Time   A : distal esophageal biopsy Tissue Esophagus, Distal TISSUE PATHOLOGY EXAM Tristen Brown MD 4/17/2019 1247       Implants:    Nothing was implanted during the procedure      Description of Procedure: After having signed informed consent, he was brought to the endoscopy suite and placed in a left lateral decubitus position and given his IV sedation.  Bite block was placed between his incisors.  Scope was introduced into oropharynx, advanced under direct visualization with ease into the esophagus.  Through the distal esophagus, Z line was moderately irregular with three or four gastric tongues.  There were no obvious strictures.  Scope was advanced to the stomach, retroflexed, revealing a normal cardia and fundus.  Scope was deretroflexed, advanced to antrum, which was normal appearing, through the pylorus and the duodenal bulb, which was normal and around the angle of the second and third portion of the duodenum, which was normal as well.  Scope was withdrawn back into the antrum, examining the posterior wall and greater curvature.  As the scope was withdrawn back to the gastroesophageal junction. Biopsies were taken of the irregular Z line to rule out shot segment Bridges's esophagus.  As the scope was withdrawn, the remainder of the esophagus was normal appearing.  The scope was taken from the patient and he tolerated the  procedure very well.          Findings:  Normal Duodenum  Normal Stomach  Reflux Esophagitis-Biopsy    Complications: None    Recommendations: Results to be called; BID PPI      Tristen Brown MD     Date: 4/17/2019  Time: 12:54 PM

## 2019-04-17 NOTE — ANESTHESIA POSTPROCEDURE EVALUATION
Patient: Lucas Oneil    Procedure Summary     Date:  04/17/19 Room / Location:  [facility] ENDOSCOPY 1 /  [facility]    Anesthesia Start:  1237 Anesthesia Stop:  1253    Procedure:  ESOPHAGOGASTRODUODENOSCOPY with biopsy (N/A Esophagus) Diagnosis:       Globus sensation      Reflux esophagitis      (Globus sensation [F45.8])    Surgeon:  Tristen Brown MD Provider:  Derrick Healy CRNA    Anesthesia Type:  MAC ASA Status:  3          Anesthesia Type: MAC  Last vitals  BP   142/79 (04/17/19 1104)   Temp   97.8 °F (36.6 °C) (04/17/19 1104)   Pulse   53 (04/17/19 1104)   Resp   22 (04/17/19 1104)     SpO2   99 % (04/17/19 1104)     Post Anesthesia Care and Evaluation    Patient location during evaluation: PHASE II  Patient participation: complete - patient participated  Level of consciousness: awake  Pain management: adequate  Airway patency: patent  Anesthetic complications: No anesthetic complications  PONV Status: none  Cardiovascular status: acceptable  Respiratory status: acceptable  Hydration status: acceptable

## 2019-04-17 NOTE — H&P
Patient Care Team:  Anuja Coon MD as PCP - General (Internal Medicine)    CHIEF COMPLAINT: Globus    HISTORY OF PRESENT ILLNESS:    Mild Dysphagia, but no meat impactions or Wt Loss      Past Medical History:   Diagnosis Date   • Arthritis    • Colon polyp    • Elevated cholesterol    • Hyperlipidemia    • Hypertension    • Seasonal allergies      Past Surgical History:   Procedure Laterality Date   • COLONOSCOPY       Family History   Problem Relation Age of Onset   • Heart attack Father    • Heart disease Sister    • Heart attack Brother    • Heart disease Brother    • Colon cancer Neg Hx    • Colon polyps Neg Hx      Social History     Tobacco Use   • Smoking status: Never Smoker   Substance Use Topics   • Alcohol use: No   • Drug use: No     Medications Prior to Admission   Medication Sig Dispense Refill Last Dose   • aspirin 81 MG EC tablet Take 81 mg by mouth Daily.   3/17/2019   • fexofenadine (ALLEGRA) 180 MG tablet Take 180 mg by mouth Daily.   4/16/2019   • hydrochlorothiazide (HYDRODIURIL) 25 MG tablet Take 25 mg by mouth Daily.   4/16/2019   • lisinopril (PRINIVIL,ZESTRIL) 10 MG tablet Take 10 mg by mouth Daily.   4/16/2019   • pantoprazole (PROTONIX) 40 MG EC tablet Take 40 mg by mouth Daily.   4/16/2019   • simvastatin (ZOCOR) 20 MG tablet Take 20 mg by mouth Every Night.   4/16/2019   • traMADol (ULTRAM) 50 MG tablet Take 50 mg by mouth As Needed.   3/17/2019   • vitamin B-6 (PYRIDOXINE) 50 MG tablet Take 50 mg by mouth Daily.   4/16/2019     Allergies:  Patient has no known allergies.    REVIEW OF SYSTEMS:  Please see the above history of present illness for pertinent positives and negatives.  The remainder of the patient's systems have been reviewed and are negative.     Vital Signs  Temp:  [97.8 °F (36.6 °C)] 97.8 °F (36.6 °C)  Heart Rate:  [53] 53  Resp:  [22] 22  BP: (142)/(79) 142/79    Flowsheet Rows      First Filed Value   Admission Height  --   Admission Weight  112 kg (246 lb 12.8  oz) Documented at 04/17/2019 1104           Physical Exam:  Physical Exam   Constitutional: Patient appears well-developed and well-nourished and in no acute distress   HEENT:   Head: Normocephalic and atraumatic.   Eyes:  Pupils are equal, round, and reactive to light. EOM are intact. Sclerae are anicteric and non-injected.  Mouth and Throat: Patient has moist mucous membranes. Oropharynx is clear of any erythema or exudate.     Neck: Neck supple. No JVD present. No thyromegaly present. No lymphadenopathy present.  Cardiovascular: Regular rate, regular rhythm, S1 normal and S2 normal.  Exam reveals no gallop and no friction rub.  No murmur heard.  Pulmonary/Chest: Lungs are clear to auscultation bilaterally. No respiratory distress. No wheezes. No rhonchi. No rales.   Abdominal: Soft. Bowel sounds are normal. No distension and no mass. There is no hepatosplenomegaly. There is no tenderness.   Musculoskeletal: Normal Muscle tone  Extremities: No edema. Pulses are palpable in all 4 extremities.  Neurological: Patient is alert and oriented to person, place, and time. Cranial nerves II-XII are grossly intact with no focal deficits.  Skin: Skin is warm. No rash noted. Nails show no clubbing.  No cyanosis or erythema.    Debilities/Disabilities Identified: None  Emotional Behavior: Appropriate     Results Review:    I reviewed the patient's new clinical results.  Lab Results (most recent)     None          Imaging Results (most recent)     None        reviewed    ECG/EMG Results (most recent)     None        reviewed    Assessment/Plan     Globus Sensation / EGD    I discussed the patients findings and my recommendations with patient.     Tristen Brown MD  04/17/19  12:33 PM    Time: 10 min prior to procedure.

## 2019-04-17 NOTE — ANESTHESIA PREPROCEDURE EVALUATION
Anesthesia Evaluation     Patient summary reviewed and Nursing notes reviewed   no history of anesthetic complications:  NPO Solid Status: > 8 hours  NPO Liquid Status: > 8 hours           Airway   Mallampati: II  TM distance: >3 FB  Neck ROM: full  No difficulty expected  Dental    (+) edentulous    Pulmonary - negative pulmonary ROS    breath sounds clear to auscultation  Cardiovascular   Exercise tolerance: good (4-7 METS)    ECG reviewed  PT is on anticoagulation therapy  Rhythm: regular  Rate: normal    (+) hypertension well controlled less than 2 medications, dysrhythmias Bradycardia, hyperlipidemia,     ROS comment: Narrative     Pankaj Rhodes MD     2/27/2018  1:35 PM    ECG 12 Lead  Date/Time: 2/27/2018 1:21 PM  Performed by: PANKAJ RHODES  Authorized by: PANKAJ RHODES   Comparison: compared with previous ECG   Similar to previous ECG  Rhythm: sinus rhythm  ST Flattening: all  Clinical impression: non-specific ECG        Neuro/Psych  (+) numbness (jabier LEs),     GI/Hepatic/Renal/Endo    (+) morbid obesity, GERD well controlled,      Musculoskeletal     Abdominal   (+) obese,    Substance History - negative use     OB/GYN          Other   (+) arthritis                     Anesthesia Plan    ASA 3     MAC     intravenous induction   Anesthetic plan, all risks, benefits, and alternatives have been provided, discussed and informed consent has been obtained with: patient.  Use of blood products discussed with patient  Consented to blood products.

## 2019-04-19 LAB
CYTO UR: NORMAL
LAB AP CASE REPORT: NORMAL
PATH REPORT.FINAL DX SPEC: NORMAL
PATH REPORT.GROSS SPEC: NORMAL

## 2019-08-27 ENCOUNTER — OFFICE VISIT (OUTPATIENT)
Dept: CARDIOLOGY | Facility: CLINIC | Age: 64
End: 2019-08-27

## 2019-08-27 VITALS
BODY MASS INDEX: 31.83 KG/M2 | WEIGHT: 235 LBS | SYSTOLIC BLOOD PRESSURE: 143 MMHG | HEIGHT: 72 IN | DIASTOLIC BLOOD PRESSURE: 67 MMHG | HEART RATE: 55 BPM

## 2019-08-27 DIAGNOSIS — E78.5 HYPERLIPIDEMIA, UNSPECIFIED HYPERLIPIDEMIA TYPE: ICD-10-CM

## 2019-08-27 DIAGNOSIS — I10 ESSENTIAL HYPERTENSION: Primary | ICD-10-CM

## 2019-08-27 DIAGNOSIS — E66.01 MORBID OBESITY DUE TO EXCESS CALORIES (HCC): ICD-10-CM

## 2019-08-27 DIAGNOSIS — R94.31 ABNORMAL EKG: ICD-10-CM

## 2019-08-27 PROCEDURE — 93000 ELECTROCARDIOGRAM COMPLETE: CPT | Performed by: INTERNAL MEDICINE

## 2019-08-27 PROCEDURE — 99213 OFFICE O/P EST LOW 20 MIN: CPT | Performed by: INTERNAL MEDICINE

## 2019-08-27 NOTE — PROGRESS NOTES
" Subjective:        Lucas Oneil is a 64 y.o. male who here for follow up    CC  The follow-up for the hypertension hyperlipidemia abnormal EKG and morbid obesity  HPI  64-year-old male with known history of abnormal EKG, hyperlipidemia, benign essential arterial hypertension and obesity here for the follow-up    Patient denies any chest pains tightness heaviness of the pressure sensation     Problem List Items Addressed This Visit        Cardiovascular and Mediastinum    Abnormal EKG    Hyperlipidemia    Essential hypertension - Primary       Digestive    Morbid obesity due to excess calories (CMS/HCC)        .    The following portions of the patient's history were reviewed and updated as appropriate: allergies, current medications, past family history, past medical history, past social history, past surgical history and problem list.    Past Medical History:   Diagnosis Date   • Arthritis    • Colon polyp    • Elevated cholesterol    • Hyperlipidemia    • Hypertension    • Seasonal allergies      reports that he has never smoked. He has never used smokeless tobacco. He reports that he does not drink alcohol or use drugs.   Family History   Problem Relation Age of Onset   • Heart attack Father    • Heart disease Sister    • Heart attack Brother    • Heart disease Brother    • Colon cancer Neg Hx    • Colon polyps Neg Hx        Review of Systems  Constitutional: No wt loss, fever, fatigue  Gastrointestinal: No nausea, abdominal pain  Behavioral/Psych: No insomnia or anxiety   Cardiovascular no chest pains or tightness no chest  Objective:       Physical Exam  /67   Pulse 55   Ht 182.9 cm (72\")   Wt 107 kg (235 lb)   BMI 31.87 kg/m²   General appearance: No acute changes   Neck: Trachea midline; NECK, supple, no thyromegaly or lymphadenopathy   Lungs: Normal size and shape, normal breath sounds, equal distribution of air, no rales and rhonchi   CV: S1-S2 regular, no murmurs, no rub, no gallop   Abdomen: " Soft, non-tender; no masses , no abnormal abdominal sounds   Extremities: No deformity , normal color , no peripheral edema   Skin: Normal temperature, turgor and texture; no rash, ulcers            ECG 12 Lead  Date/Time: 8/27/2019 2:50 PM  Performed by: Pankaj Rhodes MD  Authorized by: Pankaj Rhodes MD   Comparison: compared with previous ECG   Similar to previous ECG  Rhythm: sinus rhythm  ST Flattening: all                Echocardiogram:        Current Outpatient Medications:   •  aspirin 81 MG EC tablet, Take 81 mg by mouth Daily., Disp: , Rfl:   •  fexofenadine (ALLEGRA) 180 MG tablet, Take 180 mg by mouth Daily., Disp: , Rfl:   •  hydrochlorothiazide (HYDRODIURIL) 25 MG tablet, Take 25 mg by mouth Daily., Disp: , Rfl:   •  lisinopril (PRINIVIL,ZESTRIL) 10 MG tablet, Take 10 mg by mouth Daily., Disp: , Rfl:   •  pantoprazole (PROTONIX) 40 MG EC tablet, Take 1 tablet by mouth 2 (Two) Times a Day., Disp: 180 tablet, Rfl: 3  •  simvastatin (ZOCOR) 20 MG tablet, Take 20 mg by mouth Every Night., Disp: , Rfl:   •  traMADol (ULTRAM) 50 MG tablet, Take 50 mg by mouth As Needed., Disp: , Rfl:   •  vitamin B-6 (PYRIDOXINE) 50 MG tablet, Take 50 mg by mouth Daily., Disp: , Rfl:   •  pantoprazole (PROTONIX) 40 MG EC tablet, Take 40 mg by mouth Daily., Disp: , Rfl:    Assessment:        Patient Active Problem List   Diagnosis   • Abnormal EKG   • Chronic fatigue   • Hyperlipidemia   • Essential hypertension   • Morbid obesity due to excess calories (CMS/HCC)   • Globus sensation               Plan:            ICD-10-CM ICD-9-CM   1. Essential hypertension I10 401.9   2. Hyperlipidemia, unspecified hyperlipidemia type E78.5 272.4   3. Abnormal EKG R94.31 794.31   4. Morbid obesity due to excess calories (CMS/HCC) E66.01 278.01     1. Essential hypertension  Blood pressure under control    2. Hyperlipidemia, unspecified hyperlipidemia type  Counseling has been done    3. Abnormal EKG  Work-up  negative    4. Morbid obesity due to excess calories (CMS/HCC)  Significant risk of obesity to CAD, HTN has been explained  Advantages of wt reduction has been explained       CV STABLE    SEE IN 1 YR  COUNSELING:    Lucas Worthy was given to patient for the following topics: diagnostic results, risk factor reductions, impressions, risks and benefits of treatment options and importance of treatment compliance .       SMOKING COUNSELING:    Counseling given: Not Answered      Dictated using Dragon dictation

## 2020-08-17 ENCOUNTER — HOSPITAL ENCOUNTER (OUTPATIENT)
Dept: GENERAL RADIOLOGY | Facility: HOSPITAL | Age: 65
Discharge: HOME | End: 2020-08-17
Admitting: NURSE PRACTITIONER

## 2020-08-17 ENCOUNTER — OFFICE VISIT (OUTPATIENT)
Dept: GASTROENTEROLOGY | Facility: CLINIC | Age: 65
End: 2020-08-17

## 2020-08-17 VITALS — BODY MASS INDEX: 33.46 KG/M2 | WEIGHT: 247 LBS | HEIGHT: 72 IN | TEMPERATURE: 97.8 F

## 2020-08-17 DIAGNOSIS — K59.04 CHRONIC IDIOPATHIC CONSTIPATION: ICD-10-CM

## 2020-08-17 DIAGNOSIS — K59.04 CHRONIC IDIOPATHIC CONSTIPATION: Primary | ICD-10-CM

## 2020-08-17 DIAGNOSIS — K21.9 GERD WITHOUT ESOPHAGITIS: ICD-10-CM

## 2020-08-17 DIAGNOSIS — R11.0 NAUSEA: ICD-10-CM

## 2020-08-17 PROCEDURE — 74018 RADEX ABDOMEN 1 VIEW: CPT

## 2020-08-17 PROCEDURE — 99214 OFFICE O/P EST MOD 30 MIN: CPT | Performed by: NURSE PRACTITIONER

## 2020-08-17 RX ORDER — ONDANSETRON 4 MG/1
4 TABLET, ORALLY DISINTEGRATING ORAL EVERY 8 HOURS PRN
Qty: 60 TABLET | Refills: 3 | Status: SHIPPED | OUTPATIENT
Start: 2020-08-17

## 2020-08-17 RX ORDER — DICYCLOMINE HCL 20 MG
20 TABLET ORAL EVERY 6 HOURS
Qty: 90 TABLET | Refills: 3 | Status: SHIPPED | OUTPATIENT
Start: 2020-08-17

## 2020-08-17 NOTE — PATIENT INSTRUCTIONS
He is due for his yearly labs with Dr. Coon in the next week or so, will request these results.    X ray today.  Start with fleets enema x 2 then follow with a bottle magnesium citrate. Then take the linzess.

## 2020-08-17 NOTE — PROGRESS NOTES
PATIENT INFORMATION  Lucas Oneil       - 1955    CHIEF COMPLAINT  Chief Complaint   Patient presents with   • Follow-up     6 mo follow up on Dysphagia       HISTORY OF PRESENT ILLNESS  Is here today for n/v after eating mostly, burping up acid every time after he eats. Doesn't feel like the bid protonix is working and a year ago Dr Coon put him on miralax with very little help.     Last BM was 2-3 days ago when he drank a bottle of mag citrate.           REVIEW OF SYSTEMS  Review of Systems   Gastrointestinal: Positive for abdominal pain, constipation, nausea and vomiting.   All other systems reviewed and are negative.        ACTIVE PROBLEMS  Patient Active Problem List    Diagnosis   • Chronic idiopathic constipation [K59.04]   • GERD without esophagitis [K21.9]   • Nausea [R11.0]   • Globus sensation [R09.89]   • Abnormal EKG [R94.31]   • Chronic fatigue [R53.82]   • Hyperlipidemia [E78.5]   • Essential hypertension [I10]   • Morbid obesity due to excess calories (CMS/HCC) [E66.01]         PAST MEDICAL HISTORY  Past Medical History:   Diagnosis Date   • Arthritis    • Colon polyp    • Elevated cholesterol    • Hyperlipidemia    • Hypertension    • Seasonal allergies          SURGICAL HISTORY  Past Surgical History:   Procedure Laterality Date   • COLONOSCOPY     • ENDOSCOPY N/A 2019    Procedure: ESOPHAGOGASTRODUODENOSCOPY with biopsy;  Surgeon: Tristen Brown MD;  Location: [facility];  Service: Gastroenterology         FAMILY HISTORY  Family History   Problem Relation Age of Onset   • Heart attack Father    • Heart disease Sister    • Heart attack Brother    • Heart disease Brother    • Colon cancer Neg Hx    • Colon polyps Neg Hx          SOCIAL HISTORY  Social History     Occupational History   • Not on file   Tobacco Use   • Smoking status: Never Smoker   • Smokeless tobacco: Never Used   Substance and Sexual Activity   • Alcohol use: No   • Drug use: No   • Sexual  "activity: Defer         CURRENT MEDICATIONS    Current Outpatient Medications:   •  aspirin 81 MG EC tablet, Take 81 mg by mouth Daily., Disp: , Rfl:   •  dicyclomine (BENTYL) 20 MG tablet, Take 1 tablet by mouth Every 6 (Six) Hours., Disp: 90 tablet, Rfl: 3  •  fexofenadine (ALLEGRA) 180 MG tablet, Take 180 mg by mouth Daily., Disp: , Rfl:   •  hydrochlorothiazide (HYDRODIURIL) 25 MG tablet, Take 25 mg by mouth Daily., Disp: , Rfl:   •  linaclotide (LINZESS) 145 MCG capsule capsule, Take 1 capsule by mouth Every Morning Before Breakfast., Disp: 30 capsule, Rfl: 11  •  lisinopril (PRINIVIL,ZESTRIL) 10 MG tablet, Take 10 mg by mouth Daily., Disp: , Rfl:   •  ondansetron ODT (ZOFRAN-ODT) 4 MG disintegrating tablet, Place 1 tablet on the tongue Every 8 (Eight) Hours As Needed for Nausea or Vomiting., Disp: 60 tablet, Rfl: 3  •  pantoprazole (PROTONIX) 40 MG EC tablet, Take 40 mg by mouth Daily., Disp: , Rfl:   •  pantoprazole (PROTONIX) 40 MG EC tablet, Take 1 tablet by mouth 2 (Two) Times a Day., Disp: 180 tablet, Rfl: 3  •  simvastatin (ZOCOR) 20 MG tablet, Take 20 mg by mouth Every Night., Disp: , Rfl:   •  traMADol (ULTRAM) 50 MG tablet, Take 50 mg by mouth As Needed., Disp: , Rfl:   •  vitamin B-6 (PYRIDOXINE) 50 MG tablet, Take 50 mg by mouth Daily., Disp: , Rfl:     ALLERGIES  Patient has no known allergies.    VITALS  Vitals:    08/17/20 1414   Temp: 97.8 °F (36.6 °C)   TempSrc: Temporal   Weight: 112 kg (247 lb)   Height: 182.9 cm (72.01\")       LAST RESULTS   Admission on 04/17/2019, Discharged on 04/17/2019   Component Date Value Ref Range Status   • Case Report 04/17/2019    Final                    Value:Surgical Pathology Report                         Case: [id number]                                  Authorizing Provider:  Tristen Brown        Collected:           04/17/2019 12:47 PM                                 MD Ilir                                                                 " "  Ordering Location:     [facility]   Received:            04/17/2019 01:59 PM                                 OR                                                                           Pathologist:           Krishna Rodgers MD                                                         Specimen:    Esophagus, Distal, distal esophageal biopsy                                               • Final Diagnosis 04/17/2019    Final                    Value:This result contains rich text formatting which cannot be displayed here.   • Gross Description 04/17/2019    Final                    Value:This result contains rich text formatting which cannot be displayed here.   • Microscopic Description 04/17/2019    Final                    Value:This result contains rich text formatting which cannot be displayed here.     No results found.    PHYSICAL EXAM  Debilities/Disabilities Identified: None  Emotional Behavior: Appropriate  Wt Readings from Last 3 Encounters:   08/17/20 112 kg (247 lb)   08/27/19 107 kg (235 lb)   04/17/19 112 kg (246 lb 12.8 oz)     Ht Readings from Last 1 Encounters:   08/17/20 182.9 cm (72.01\")     Body mass index is 33.49 kg/m².  Physical Exam   Constitutional: He is oriented to person, place, and time. He appears well-developed and well-nourished.   HENT:   Head: Normocephalic and atraumatic.   Eyes: Pupils are equal, round, and reactive to light. Conjunctivae are normal.   Neck: Normal range of motion. Neck supple.   Cardiovascular: Normal rate and regular rhythm.   Pulmonary/Chest: Effort normal and breath sounds normal.   Abdominal: Soft. Bowel sounds are normal. He exhibits distension. He exhibits no ascites. There is no hepatosplenomegaly. There is tenderness in the epigastric area. A hernia is present. Hernia confirmed positive in the ventral area.   Pronounced epigastric tenderness, small bowel bloating and increased tenderness in bilat LQ   Musculoskeletal: Normal range of " motion.   Lymphadenopathy:     He has no cervical adenopathy.   Neurological: He is alert and oriented to person, place, and time.   Skin: Skin is warm and dry.   Psychiatric: He has a normal mood and affect. His behavior is normal.   Nursing note and vitals reviewed.      CLINICAL DATA REVIEWED   reviewed previous lab results and integrated with today's visit, reviewed notes from other physicians and/or last GI encounter, reviewed previous endoscopy results and available photos    ASSESSMENT  Diagnoses and all orders for this visit:    Chronic idiopathic constipation  -     XR Abdomen KUB; Future  -     linaclotide (LINZESS) 145 MCG capsule capsule; Take 1 capsule by mouth Every Morning Before Breakfast.  -     dicyclomine (BENTYL) 20 MG tablet; Take 1 tablet by mouth Every 6 (Six) Hours.  -     ondansetron ODT (ZOFRAN-ODT) 4 MG disintegrating tablet; Place 1 tablet on the tongue Every 8 (Eight) Hours As Needed for Nausea or Vomiting.    GERD without esophagitis    Nausea  -     dicyclomine (BENTYL) 20 MG tablet; Take 1 tablet by mouth Every 6 (Six) Hours.  -     ondansetron ODT (ZOFRAN-ODT) 4 MG disintegrating tablet; Place 1 tablet on the tongue Every 8 (Eight) Hours As Needed for Nausea or Vomiting.          PLAN  Return in about 2 months (around 10/17/2020).     He is due for his yearly labs with Dr. Coon in the next week or so, will request these results.    X ray today.  Start with fleets enema x 2 then follow with a bottle magnesium citrate. Then take the linzess.     I have discussed the above plan with the patient.  They verbalize understanding and are in agreement with the plan.  They have been advised to contact the office for any questions, concerns, or changes related to their health.

## (undated) DEVICE — HYBRID TUBING/CAP SET FOR OLYMPUS® SCOPES: Brand: ERBE

## (undated) DEVICE — ENDO. PORT CONNECTOR W/VALVE FOR OLYMPUS® SCOPES: Brand: ERBE

## (undated) DEVICE — JACKT LAB F/R KNIT CUFF/COLR XLG BLU

## (undated) DEVICE — THE BITE BLOCK MAXI, LATEX FREE STRAP IS USED TO PROTECT THE ENDOSCOPE INSERTION TUBE FROM BEING BITTEN BY THE PATIENT.

## (undated) DEVICE — BW-412T DISP COMBO CLEANING BRUSH: Brand: SINGLE USE COMBINATION CLEANING BRUSH

## (undated) DEVICE — Device: Brand: DEFENDO AIR/WATER/SUCTION AND BIOPSY VALVE

## (undated) DEVICE — FRCP BX RADJAW4 NDL 2.8 240CM LG OG BX40

## (undated) DEVICE — SYR LL 3CC

## (undated) DEVICE — GOWN ISOL W/THUMB UNIV BLU BX/15

## (undated) DEVICE — VIAL FORMALIN CAP 10P 40ML

## (undated) DEVICE — Device

## (undated) DEVICE — SPNG GZ WOVN 4X4IN 12PLY 10/BX STRL

## (undated) DEVICE — SUCTION CANISTER, 3000CC,SAFELINER: Brand: DEROYAL

## (undated) DEVICE — GLV SURG SENSICARE MICRO PF LF 7.5 STRL

## (undated) DEVICE — MASK,FACE,FLUID RESIST,SHLD,EARLOOP: Brand: MEDLINE